# Patient Record
Sex: FEMALE | Race: BLACK OR AFRICAN AMERICAN | NOT HISPANIC OR LATINO | Employment: UNEMPLOYED | URBAN - METROPOLITAN AREA
[De-identification: names, ages, dates, MRNs, and addresses within clinical notes are randomized per-mention and may not be internally consistent; named-entity substitution may affect disease eponyms.]

---

## 2022-09-12 ENCOUNTER — HOSPITAL ENCOUNTER (EMERGENCY)
Facility: HOSPITAL | Age: 32
Discharge: HOME | End: 2022-09-12
Attending: EMERGENCY MEDICINE | Admitting: EMERGENCY MEDICINE

## 2022-09-12 VITALS
OXYGEN SATURATION: 100 % | HEART RATE: 87 BPM | SYSTOLIC BLOOD PRESSURE: 154 MMHG | BODY MASS INDEX: 42.57 KG/M2 | WEIGHT: 249.34 LBS | RESPIRATION RATE: 16 BRPM | TEMPERATURE: 97.6 F | HEIGHT: 64 IN | DIASTOLIC BLOOD PRESSURE: 94 MMHG

## 2022-09-12 DIAGNOSIS — Z20.2 STD EXPOSURE: Primary | ICD-10-CM

## 2022-09-12 LAB
C TRACH RRNA CVX QL NAA+PROBE: NOT DETECTED
CLUE CELLS SPEC QL WET PREP: ABNORMAL
HYDATID CYST SPEC WET PREP: ABNORMAL
N GONORRHOEA RRNA SPEC QL NAA+PROBE: NOT DETECTED
T VAGINALIS SPEC QL WET PREP: ABNORMAL
WBC SPEC QL WET PREP: ABNORMAL
YEAST GENITAL QL WET PREP: ABNORMAL

## 2022-09-12 PROCEDURE — 87210 SMEAR WET MOUNT SALINE/INK: CPT | Performed by: EMERGENCY MEDICINE

## 2022-09-12 PROCEDURE — 99283 EMERGENCY DEPT VISIT LOW MDM: CPT

## 2022-09-12 PROCEDURE — 87591 N.GONORRHOEAE DNA AMP PROB: CPT | Performed by: EMERGENCY MEDICINE

## 2022-09-12 PROCEDURE — 87491 CHLMYD TRACH DNA AMP PROBE: CPT | Performed by: EMERGENCY MEDICINE

## 2022-09-12 NOTE — ED PROVIDER NOTES
"Subjective   PIT    History of Present Illness  Concern for possible trichomonas  32-year-old female states that she found out over the last few days that her boyfriend did not get treated for the Trichomonas like she had instructed him to back in July.  States she had tested positive at that time and she herself got treated.  She denies any new vaginal discharge or abdominal pain or fevers or chills.  States she has had some slight vaginal irritation over the last 1 week.  She reports no dysuria.  She states she is scheduled for hysterectomy next month due to fibroids  Review of Systems   Constitutional: Negative.    HENT: Negative.    Respiratory: Negative.    Gastrointestinal: Negative.    Genitourinary: Positive for vaginal pain. Negative for pelvic pain, urgency, vaginal bleeding and vaginal discharge.       No past medical history on file.    No Known Allergies    No past surgical history on file.    No family history on file.    Social History     Socioeconomic History   • Marital status: Single     Prior to Admission medications    Not on File     /94 (BP Location: Left arm, Patient Position: Sitting)   Pulse 87   Temp 97.6 °F (36.4 °C)   Resp 16   Ht 161.3 cm (63.5\")   Wt 113 kg (249 lb 5.4 oz)   SpO2 100%   BMI 43.48 kg/m²   I examined the patient using the appropriate personal protective equipment.          Objective   Physical Exam  General: Well-appearing, no acute distress  Psych: Oriented, pleasant affect  Respirations: Clear, nonlabored respirations  Skin: No rash, normal color  Abdomen soft nontender nondistended, no pelvic tenderness   normal appearance no lesions no abnormal discharge, no apparent mucosal swelling or erythema in the vaginal area, exam performed with female nurse in the room  Procedures           ED Course            Results for orders placed or performed during the hospital encounter of 09/12/22   Wet Prep, Genital - Swab, Vagina    Specimen: Vagina; Swab   Result " Value Ref Range    YEAST No yeast seen No yeast seen    HYPHAL ELEMENTS No Hyphal elements seen No Hyphal elements seen    WBC'S 2+ WBC's seen (A) No WBC's seen    Clue Cells, Wet Prep No Clue cells seen No Clue cells seen    Trichomonas, Wet Prep No Trichomonas seen No Trichomonas seen   Chlamydia trachomatis, Neisseria gonorrhoeae, PCR - Swab, Urine, Random Void    Specimen: Urine, Random Void; Swab   Result Value Ref Range    Chlamydia DNA by PCR Not Detected Not Detected, Invalid    Neisseria gonorrhoeae by PCR Not Detected Not Detected                                       MDM  Patient was concerned about possible recurrent trichomonas.  Her exam was normal her screen was negative for trichomonas and screen negative for GC and chlamydia.  Patient was advised the findings.  She states she will follow-up with her gynecologist.  She was discharged in good condition was given warning signs for return.  Final diagnoses:   STD exposure       ED Disposition  ED Disposition     ED Disposition   Discharge    Condition   Stable    Comment   --             No follow-up provider specified.       Medication List      No changes were made to your prescriptions during this visit.          Raúl Nava MD  09/12/22 [id number]

## 2022-09-12 NOTE — DISCHARGE INSTRUCTIONS
Follow-up with your gynecologist this week.  Return for increased pain, fever, vomiting or any other concerns